# Patient Record
Sex: FEMALE | Race: WHITE | NOT HISPANIC OR LATINO
[De-identification: names, ages, dates, MRNs, and addresses within clinical notes are randomized per-mention and may not be internally consistent; named-entity substitution may affect disease eponyms.]

---

## 2022-05-17 PROBLEM — Z00.00 ENCOUNTER FOR PREVENTIVE HEALTH EXAMINATION: Status: ACTIVE | Noted: 2022-05-17

## 2022-06-27 ENCOUNTER — APPOINTMENT (OUTPATIENT)
Dept: FAMILY MEDICINE | Facility: CLINIC | Age: 37
End: 2022-06-27
Payer: COMMERCIAL

## 2022-06-27 ENCOUNTER — RESULT CHARGE (OUTPATIENT)
Age: 37
End: 2022-06-27

## 2022-06-27 VITALS
HEIGHT: 67 IN | TEMPERATURE: 97.3 F | OXYGEN SATURATION: 100 % | BODY MASS INDEX: 18.99 KG/M2 | WEIGHT: 121 LBS | HEART RATE: 87 BPM | RESPIRATION RATE: 16 BRPM

## 2022-06-27 DIAGNOSIS — N76.0 ACUTE VAGINITIS: ICD-10-CM

## 2022-06-27 DIAGNOSIS — R35.0 FREQUENCY OF MICTURITION: ICD-10-CM

## 2022-06-27 PROCEDURE — 99203 OFFICE O/P NEW LOW 30 MIN: CPT | Mod: 25

## 2022-06-27 PROCEDURE — 81003 URINALYSIS AUTO W/O SCOPE: CPT | Mod: QW

## 2022-06-27 RX ORDER — FLUCONAZOLE 150 MG/1
150 TABLET ORAL
Qty: 2 | Refills: 0 | Status: ACTIVE | COMMUNITY
Start: 2022-06-27 | End: 1900-01-01

## 2022-06-27 NOTE — PLAN
[FreeTextEntry1] : Patient exam completed with chaperone BRETT Mir. \par \par Vulvovaginitis- prescribed fluconazole 150mg.\par \par UA- negative\par \par Follow up in 7 days if symptoms persist.

## 2022-06-27 NOTE — HISTORY OF PRESENT ILLNESS
[FreeTextEntry8] : Patient presents for vaginal burning that started on friday. Burning only occurs after urinating. Not during. She denies urinary frequency, pain with urination, fevers or flank pain at this time. She denies vaginal discharge, but says her labias look irritated.

## 2022-06-27 NOTE — REVIEW OF SYSTEMS
[Dysuria] : no dysuria [Incontinence] : no incontinence [Hematuria] : no hematuria [Frequency] : no frequency [Negative] : Heme/Lymph [FreeTextEntry8] : vaginal burning

## 2022-07-01 LAB
BILIRUB UR QL STRIP: NEGATIVE
CLARITY UR: CLEAR
COLLECTION METHOD: NORMAL
GLUCOSE UR-MCNC: NEGATIVE
HCG UR QL: 0.2 EU/DL
HGB UR QL STRIP.AUTO: NEGATIVE
KETONES UR-MCNC: NEGATIVE
LEUKOCYTE ESTERASE UR QL STRIP: NEGATIVE
NITRITE UR QL STRIP: NEGATIVE
PH UR STRIP: 6.5
PROT UR STRIP-MCNC: NEGATIVE
SP GR UR STRIP: 1